# Patient Record
Sex: MALE | Race: OTHER
[De-identification: names, ages, dates, MRNs, and addresses within clinical notes are randomized per-mention and may not be internally consistent; named-entity substitution may affect disease eponyms.]

---

## 2017-04-25 ENCOUNTER — HOSPITAL ENCOUNTER (EMERGENCY)
Dept: HOSPITAL 74 - JERFT | Age: 67
Discharge: HOME | End: 2017-04-25
Payer: COMMERCIAL

## 2017-04-25 VITALS — DIASTOLIC BLOOD PRESSURE: 82 MMHG | SYSTOLIC BLOOD PRESSURE: 151 MMHG

## 2017-04-25 VITALS — TEMPERATURE: 97.7 F | HEART RATE: 61 BPM

## 2017-04-25 VITALS — BODY MASS INDEX: 26.6 KG/M2
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
## 2017-04-25 DIAGNOSIS — S46.811A: ICD-10-CM
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
## 2017-04-25 DIAGNOSIS — S16.1XXA: Primary | ICD-10-CM

## 2017-04-25 NOTE — PDOC
*Physical Exam





- Vital Signs


 Last Vital Signs











Temp Pulse Resp BP Pulse Ox


 


 98.3 F   70   18   151/82   100 


 


 04/25/17 11:17  04/25/17 11:17  04/25/17 11:17  04/25/17 11:17  04/25/17 11:17














- Physical Exam


Comments: 


04/25/17 11:58


MIDLEVEL NOTE 


Pt seen by Midlevel Provider under my direct supervision. Pt interviewed and 

examined. Ancillary studies reviewed. I agree with plan as outlined by Midlevel 

Provider. 








04/27/17 10:13


CT scan of the head without contrast


No evidence of acute intracranial hemorrhage, edema, midline shift, mass effect

, or skull fracture


No CT evidence of acute arterial infarction


Empty sella





CT scan of the C-spine


No evidence of acute fracture, compression deformities, subluxation, or 

prevertebral soft tissue swelling


Status post anterior cervical fusion at C5 C6 C7


Multilevel facet joint arthropathy and DJD





*DC/Admit/Observation/Transfer


Diagnosis at time of Disposition: 


 Strain of cervical portion of right trapezius muscle





- Discharge Dispostion


Disposition: HOME


Condition at time of disposition: Improved





- Prescriptions


Prescriptions: 


Oxycodone HCl/Acetaminophen [Percocet 5-325 mg Tablet] 1 - 2 tab PO Q6H PRN #12 

tab MDD 4


 PRN Reason: Muscle Spasms


Diazepam [Valium] 5 mg PO Q8H PRN #15 tablet MDD 3


 PRN Reason: Muscle Spasms





- Referrals


Referrals: 


Toribio Rutherford MD [Primary Care Provider] - 





- Patient Instructions


Printed Discharge Instructions:  DI for Neck Pain


Additional Instructions: 


You must continue the Valium and Percocet for the muscle spasm and apply heat 

to the affected area .


do not operate any heavy machinery while taking this medication and return to 

ED if symptoms worsen.

## 2017-04-25 NOTE — PDOC
History of Present Illness





- General


Chief Complaint: Pain


Stated Complaint: NECK PAIN


Time Seen by Provider: 04/25/17 11:32


History Source: Patient


Exam Limitations: No Limitations





- History of Present Illness


Initial Comments: 





04/25/17 13:38


67-year-old male presents to the ED with right neck stiffness that radiates 

from the base of his skull to his posterior right shoulder. Patient states has 

been using topicals to the area with no relief and states awoke with this pain 

3 days ago. Patient states has chronic nerve pain to his neck but never to this 

extent. Patient denies weakness, skin discoloration, difficulty swallowing, 

anterior neck pain, or occipital discomfort. 


Timing/Duration: constant


Severity: moderate


Associated Symptoms: reports: other





Past History





- Past Medical History


Allergies/Adverse Reactions: 


 Allergies











Allergy/AdvReac Type Severity Reaction Status Date / Time


 


No Known Drug Allergies Allergy   Verified 04/25/17 11:21











Home Medications: 


Ambulatory Orders





Losartan/Hydrochlorothiazide [Losartan-Hctz 100-25 mg Tablet] 1 each PO DAILY 11 /29/12 


Aspirin [ASA -] 81 mg PO DAILY 12/03/12 


Meloxicam [Mobic -] 15 mg PO DAILY 04/15/15 


Metformin HCl [Metformin HCl ER] 500 mg PO BID 04/15/15 


Oxycodone HCl/Acetaminophen [Percocet 5-325 mg Tablet -] 1 - 2 tab PO Q6H #60 

tab 04/16/15 








Anemia: No


Asthma: No


Cancer: No


Cardiac Disorders: No


CVA: No


COPD: No


CHF: No


Dementia: No


Diabetes: Yes (2001)


GI Disorders: No


 Disorders: No


HTN: Yes


Hypercholesterolemia: No


Liver Disease: No


Seizures: No


Thyroid Disease: No





- Surgical History


Abdominal Surgery: No


Appendectomy: No


Cardiac Surgery: No


Cholecystectomy: No


Lung Surgery: No


Neurologic Surgery: No


Orthopedic Surgery: Yes (CERVICAL FUSION 2001)





- Immunization History


Immunization Up to Date: Yes





- Psycho/Social/Smoking Cessation Hx


Anxiety: No


Suicidal Ideation: No


Smoking Status: No


Smoking History: Never smoked


Have you smoked in the past 12 months: No


Number of Cigarettes Smoked Daily: 0


Information on smoking cessation initiated: No


Hx Alcohol Use: No


Drug/Substance Use Hx: No


Substance Use Type: None


Hx Substance Use Treatment: No


Patient Lives Alone: No


Lives with/in: spouse/SO





**Review of Systems





- Review of Systems


Able to Perform ROS?: Yes


Constitutional: No: Symptoms Reported


HEENTM: No: Symptoms Reported


Respiratory: No: Symptoms reported


Cardiac (ROS): No: Symptoms Reported


ABD/GI: No: Symptoms Reported


Musculoskeletal: Yes: Muscle Pain, Neck Pain


Integumentary: No: Symptoms Reported


Neurological: No: Headache, Numbness, Weakness, Dizziness


Hematologic/Lymphatic: No: Symptoms Reported





*Physical Exam





- Vital Signs


 Last Vital Signs











Temp Pulse Resp BP Pulse Ox


 


 98.3 F   70   18   151/82   100 


 


 04/25/17 11:17  04/25/17 11:17  04/25/17 11:17  04/25/17 11:17 04/25/17 11:17














- Physical Exam


General Appearance: Yes: Nourished, Appropriately Dressed


HEENT: positive: EOMI, SEB, Normal Voice, TMs Normal, Pharynx Normal.  negative

: Pale Conjunctivae


Neck: positive: Tender (right SCM. No midline  tenderness), Supple, Decreased 

range of motion (unable to rotate or flex/ extend the neck)


Respiratory/Chest: positive: Lungs Clear, Normal Breath Sounds.  negative: 

Chest Tender, Respiratory Distress, Accessory Muscle Use


Cardiovascular: positive: Regular Rhythm, Regular Rate.  negative: Murmur


Gastrointestinal/Abdominal: positive: Soft.  negative: Tenderness


Extremity: positive: Normal Capillary Refill.  negative: Pedal Edema


Integumentary: positive: Normal Color, Warm, Moist


Neurologic: positive: Motor Strength 5/5 ( ambulatory)





ED Treatment Course





- RADIOLOGY


Radiology Studies Ordered: 














 Category Date Time Status


 


 CERVICAL SPINE CT W/O CONTR [CT] Stat CT Scan  04/25/17 12:20 Taken


 


 HEAD CT WITHOUT CONTRAST [CT] Stat CT Scan  04/25/17 12:20 Taken














- Medications


Given in the ED: 


ED Medications














Discontinued Medications














Generic Name Dose Route Start Last Admin





  Trade Name Freq  PRN Reason Stop Dose Admin


 


Diazepam  5 mg 04/25/17 12:20 04/25/17 12:42





  Valium -  PO 04/25/17 12:21  5 mg





  ONCE ONE   Administration


 


Oxycodone/Acetaminophen  1 combo 04/25/17 12:20 04/25/17 12:41





  Percocet 5/325 -  PO 04/25/17 12:21  1 combo





  ONCE ONE   Administration














Medical Decision Making





- Medical Decision Making


04/25/17 13:43


Patient with sudden onset of right neck stiffness upon awakening 3 days ago. 

Patient strike topicals and Tylenol with no relief. Patient states has had 

similar pain in the past but normally not this long and not to this extent. 

Patient states history of cervical fusion and denies any postoperative 

complications. Patient on exam had tenderness to the right SCM with minimal  

palpation. 


Patient ordered for head and neck CT secondary to history of questionable CVA 

and cervical fusion along with Valium and Percocet.





04/25/17 14:25


CT of the head shows no evidence of acute intramural hemorrhage edema, midline 

shift mass effect, skull fracture.


CT of the neck shows no evidence acute fracture, compression, subluxation, or 

prevertebral soft tissue swelling. Fusion of C5 C6 C7 is seen.


Patient states since then better after receiving Percocet and Valium. Patient 

will be discharged home with the same and told to follow-up with his PCP.





*DC/Admit/Observation/Transfer


Diagnosis at time of Disposition: 


 Strain of cervical portion of right trapezius muscle





- Discharge Dispostion


Disposition: HOME


Condition at time of disposition: Improved





- Referrals


Referrals: 


Toribio Rutherford MD [Primary Care Provider] - 





- Patient Instructions


Printed Discharge Instructions:  DI for Neck Pain


Additional Instructions: 


You must continue the Valium and Percocet for the muscle spasm and apply heat 

to the affected area .


do not operate any heavy machinery while taking this medication and return to 

ED if symptoms worsen.

## 2021-08-03 ENCOUNTER — HOSPITAL ENCOUNTER (EMERGENCY)
Dept: HOSPITAL 74 - JERFT | Age: 71
Discharge: HOME | End: 2021-08-03
Payer: COMMERCIAL

## 2021-08-03 VITALS — TEMPERATURE: 97.6 F

## 2021-08-03 VITALS — HEART RATE: 60 BPM | SYSTOLIC BLOOD PRESSURE: 156 MMHG | DIASTOLIC BLOOD PRESSURE: 88 MMHG

## 2021-08-03 VITALS — BODY MASS INDEX: 26.6 KG/M2

## 2021-08-03 DIAGNOSIS — M54.2: Primary | ICD-10-CM

## 2021-08-03 PROCEDURE — 3E0233Z INTRODUCTION OF ANTI-INFLAMMATORY INTO MUSCLE, PERCUTANEOUS APPROACH: ICD-10-PCS

## 2023-05-12 ENCOUNTER — HOSPITAL ENCOUNTER (EMERGENCY)
Dept: HOSPITAL 74 - JER | Age: 73
Discharge: HOME | End: 2023-05-12
Payer: COMMERCIAL

## 2023-05-12 VITALS — BODY MASS INDEX: 29.5 KG/M2

## 2023-05-12 VITALS
DIASTOLIC BLOOD PRESSURE: 78 MMHG | HEART RATE: 78 BPM | SYSTOLIC BLOOD PRESSURE: 133 MMHG | TEMPERATURE: 98.4 F | RESPIRATION RATE: 19 BRPM

## 2023-05-12 DIAGNOSIS — M54.2: ICD-10-CM

## 2023-05-12 DIAGNOSIS — R51.9: Primary | ICD-10-CM

## 2023-05-12 LAB
ALBUMIN SERPL-MCNC: 3.3 G/DL (ref 3.4–5)
ALP SERPL-CCNC: 68 U/L (ref 45–117)
ALT SERPL-CCNC: 17 U/L (ref 13–61)
ANION GAP SERPL CALC-SCNC: 4 MMOL/L (ref 8–16)
AST SERPL-CCNC: 10 U/L (ref 15–37)
BASOPHILS # BLD: 0.8 % (ref 0–2)
BILIRUB SERPL-MCNC: 0.6 MG/DL (ref 0.2–1)
BUN SERPL-MCNC: 19.4 MG/DL (ref 7–18)
CALCIUM SERPL-MCNC: 9.2 MG/DL (ref 8.5–10.1)
CHLORIDE SERPL-SCNC: 104 MMOL/L (ref 98–107)
CO2 SERPL-SCNC: 32 MMOL/L (ref 21–32)
CREAT SERPL-MCNC: 1.3 MG/DL (ref 0.55–1.3)
DEPRECATED RDW RBC AUTO: 13.9 % (ref 11.9–15.9)
EOSINOPHIL # BLD: 3.1 % (ref 0–4.5)
GLUCOSE SERPL-MCNC: 201 MG/DL (ref 74–106)
HCT VFR BLD CALC: 39.3 % (ref 35.4–49)
HGB BLD-MCNC: 13.5 GM/DL (ref 11.7–16.9)
LYMPHOCYTES # BLD: 20.1 % (ref 8–40)
MCH RBC QN AUTO: 28.3 PG (ref 25.7–33.7)
MCHC RBC AUTO-ENTMCNC: 34.5 G/DL (ref 32–35.9)
MCV RBC: 82.2 FL (ref 80–96)
MONOCYTES # BLD AUTO: 8 % (ref 3.8–10.2)
NEUTROPHILS # BLD: 68 % (ref 42.8–82.8)
PLATELET # BLD AUTO: 284 10^3/UL (ref 134–434)
PMV BLD: 7.8 FL (ref 7.5–11.1)
POTASSIUM SERPLBLD-SCNC: 4.6 MMOL/L (ref 3.5–5.1)
PROT SERPL-MCNC: 7.1 G/DL (ref 6.4–8.2)
RBC # BLD AUTO: 4.78 M/MM3 (ref 4–5.6)
SODIUM SERPL-SCNC: 139 MMOL/L (ref 136–145)
WBC # BLD AUTO: 7.4 K/MM3 (ref 4–10)

## 2023-05-12 PROCEDURE — 3E033NZ INTRODUCTION OF ANALGESICS, HYPNOTICS, SEDATIVES INTO PERIPHERAL VEIN, PERCUTANEOUS APPROACH: ICD-10-PCS | Performed by: EMERGENCY MEDICINE

## 2023-05-12 PROCEDURE — 3E033GC INTRODUCTION OF OTHER THERAPEUTIC SUBSTANCE INTO PERIPHERAL VEIN, PERCUTANEOUS APPROACH: ICD-10-PCS | Performed by: EMERGENCY MEDICINE
